# Patient Record
Sex: FEMALE | Race: BLACK OR AFRICAN AMERICAN | Employment: PART TIME | URBAN - METROPOLITAN AREA
[De-identification: names, ages, dates, MRNs, and addresses within clinical notes are randomized per-mention and may not be internally consistent; named-entity substitution may affect disease eponyms.]

---

## 2018-05-17 ENCOUNTER — HOSPITAL ENCOUNTER (OUTPATIENT)
Dept: PHYSICAL THERAPY | Age: 43
Discharge: HOME | End: 2018-05-17
Payer: MEDICAID

## 2018-05-17 ENCOUNTER — APPOINTMENT (OUTPATIENT)
Dept: PHYSICAL THERAPY | Age: 43
End: 2018-05-17
Payer: MEDICAID

## 2018-05-17 PROCEDURE — 97110 THERAPEUTIC EXERCISES: CPT

## 2018-05-17 PROCEDURE — 97140 MANUAL THERAPY 1/> REGIONS: CPT

## 2018-05-17 PROCEDURE — 97161 PT EVAL LOW COMPLEX 20 MIN: CPT

## 2018-05-17 NOTE — PROGRESS NOTES
PT DAILY TREATMENT NOTE     Patient Name: Flo Beltran  Date:2018  : 1975  [x]  Patient  Verified  Payor: BLUE CROSS MEDICAID / Plan: [facility] HEALTHKEEPERS PLUS / Product Type: Managed Care Medicaid /    In time:3:04  Out time: 3:55  Total Treatment Time (min): 51  Total Timed Codes (min): 25  1:1 Treatment Time (min): 51   Visit #: 1 of     Treatment Area: Acute leg pain, right [M79.604]    SUBJECTIVE  Pain Level (0-10 scale): 7  Any medication changes, allergies to medications, adverse drug reactions, diagnosis change, or new procedure performed?: [x] No    [] Yes (see summary sheet for update)  Subjective functional status/changes:   [] No changes reported  SEE IE for Subjective Information    OBJECTIVE    15 min Therapeutic Exercise:  [x] See flow sheet : AROM R ankle, strap gastroc/solus stretch, Ankle ABCs, heel slide, pre gait and gait training    Rationale: increase ROM, increase strength and improve coordination to improve the patients ability to improve gait, work tolerance    10 min Manual Therapy:  IASTM to R gastroc in prone    Rationale: decrease pain, increase ROM, increase tissue extensibility and decrease trigger points to improve gait           x min Patient Education: [x] Review HEP    [] Progressed/Changed HEP based on:  HEP, prognosis, gait      [] positioning   [] body mechanics   [] transfers   [x] heat/ice application        Other Objective/Functional Measures:     Physical Therapy Evaluation  - Foot and Ankle    Gait: (B) AC, foot flat on the R, slow, antalgic     ROM/Strength        AROM        PROM            Strength (1-5)   Left Right Left Right Left  Right   Dorsiflexion 15 0  0 4 pain   Plantarflexion  20  30 4 pain   Inversion 35 22  25 4 Pain    Eversion 15 8  10 4 Pain    Great Toe Ext     NA NA   Great Toe Flex NA NA   NA NA     Flexibility:   Gastroc: see abov  Soleus: NE  HS - 90/90 HS test R 60, L 30    Palpation: NT   Left  Right   1st ray Midfoot      calcaneus     Navicular drop          Optional Tests:  SLS  (L):  (R): limited by pain    Heel/toe walking: none     Swelling:   Left (cm) Right (cm)   Figure 8:     Midfoot:      Malleoli Level:     MTH:     2\" proximal to Mall:     4\" Prox to malleoli       Other tests/ comments:    R knee AAROM 0 to 120  L knee: 0 to 122    Pain Level (0-10 scale) post treatment: 6    ASSESSMENT/Changes in Function: see IE. Pt demo's improved gait pattern with heel to toe, still lacking full toe off on the R but with improved weight shifting on the R. Anticipate D/C of crutches soon due to progress today     Patient will continue to benefit from skilled PT services to modify and progress therapeutic interventions, address functional mobility deficits, address ROM deficits, address strength deficits, analyze and address soft tissue restrictions, analyze and cue movement patterns, analyze and modify body mechanics/ergonomics, assess and modify postural abnormalities, address imbalance/dizziness and instruct in home and community integration to attain remaining goals.      [x]  See Plan of Care  []  See progress note/recertification  []  See Discharge Summary         Progress towards goals / Updated goals:  SEE IE FOR GOALS     PLAN  []  Upgrade activities as tolerated     []  Continue plan of care  []  Update interventions per flow sheet       []  Discharge due to:_  [x]  Other:Initiate POC as stated in the IE      Justification for Eval Code Complexity:  Patient History : low  Examination see IE  Clinical Presentation: stable  Clinical Decision Making : OVIDIO 30/100     Ashwin Fragoso DPT 5/17/2018  3:07 PM

## 2018-05-17 NOTE — PROGRESS NOTES
[address] [facility] [address] [facility] AT [address] [facility], [place] [address]  Phone: [phone] Fax: [phone] / STATEMENT OF MEDICAL NECESSITY FOR PHYSICAL THERAPY SERVICES  Patient Name: Gaston Ceballos : 1975   Medical   Diagnosis: Acute leg pain, right [M79.604] Treatment Diagnosis: R gastroc strain    Onset Date: 18     Referral Source: Niya Heredia MD [facility] [place] by [facility]): 2018   Prior Hospitalization: See medical history Provider #: [id number]   Prior Level of Function: WNL, working full time   Comorbidities: DM   Medications: Verified on Patient Summary List   The Plan of Care and following information is based on the information from the initial evaluation.   ========================================================================  Assessment / key information:  Pt is a 37 yo female with R gastroc sprain when running from a bumble bee on 18. Notes immediate pain, no swelling or pop. No imaging performed. Pain remains relatively unchanged. Social: stairs to enter,  no railing; lives with daughter. Previous rx has included the following: none. She presents with pain ranging from 5-8/10, located posterior R gastroc, intermittently into R ankle and R knee due to gait deviations. Pain is made worse with walking 2 minutes, better with sitting down. No edema, bruising. (-) Fenton test.  Gastroc R 0, L 20. HS 90/90: R 60, L 30. Functional Limitations include Out of work, entertainment, walking, standing for ADLs and self-care. Pt will benefit from PT interventions to address the aforementioned deficits and allow pt to return to PLOF.                   AROM                             PROM                          Strength (1-5)    Left Right Left Right Left  Right   Dorsiflexion 15 0   0 4 pain   Plantarflexion   20   30 4 pain   Inversion 35 22   25 4 Pain    Eversion 15 8   10 4 Pain    Great Toe Ext         NA NA   Great Toe Flex NA NA     NA NA   ====================================================  Eval Complexity: History: LOW Complexity : Zero comorbidities / personal factors that will impact the outcome / POCExam:HIGH Complexity : 4+ Standardized tests and measures addressing body structure, function, activity limitation and / or participation in recreation  Presentation: LOW Complexity : Stable, uncomplicated  Clinical Decision Making:MEDIUM Complexity : FOTO score of 26-74Overall Complexity:LOW   Problem List: pain affecting function, decrease ROM, decrease strength, edema affecting function, impaired gait/ balance, decrease ADL/ functional abilitiies, decrease activity tolerance, decrease flexibility/ joint mobility and decrease transfer abilities   Treatment Plan may include any combination of the following: Therapeutic exercise, Therapeutic activities, Neuromuscular re-education, Physical agent/modality, Gait/balance training, Manual therapy, Patient education, Self Care training, Functional mobility training, Home safety training and Stair training  Patient / Family readiness to learn indicated by: asking questions, trying to perform skills and interest  Persons(s) to be included in education: patient (P)  Barriers to Learning/Limitations: None  Measures taken:    Patient Goal (s): \"no pain \"   Patient self reported health status: fair  Rehabilitation Potential: good   Short Term Goals: To be accomplished in  2  treatments:  1. Pt will be I and compliant with HEP to improve deficits and allow pt to return to PLOF    Long Term Goals: To be accomplished in  8-12  treatments:  1. Pt will increase score on the FOTO to > or = 57/100 to demo an increase in functional activity tolerance. 2. Pt will have an increase in R ankle AROM to 12 DF and > or = 40 PF to improve gait, stair negotiation and return to work   3. Pt will be able to self-manage pain to < or = 2/10 ave to improve quality of life and functional activities.    4. Pt will be able to walk with normal, independent gait for > or = 10 minutes without an increase in pain to return to work. Frequency / Duration:   Patient to be seen  2  times per week for 8-12  treatments:  Patient / Caregiver education and instruction: self care, activity modification and exercises  G-Codes (GP): jack  Therapist Signature: Anila Roberson DPT Date: 0/98/0264   Certification Period: na Time: 3:07 PM   ========================================================================  I certify that the above Physical Therapy Services are being furnished while the patient is under my care. I agree with the treatment plan and certify that this therapy is necessary. Physician Signature:        Date:       Time:   Please sign and return to [facility] or you may fax the signed copy to [phone]. Thank you.

## 2018-05-21 ENCOUNTER — HOSPITAL ENCOUNTER (OUTPATIENT)
Dept: PHYSICAL THERAPY | Age: 43
Discharge: HOME | End: 2018-05-21
Payer: MEDICAID

## 2018-05-21 PROCEDURE — 97110 THERAPEUTIC EXERCISES: CPT

## 2018-05-21 PROCEDURE — 97140 MANUAL THERAPY 1/> REGIONS: CPT

## 2018-05-21 NOTE — PROGRESS NOTES
PT DAILY TREATMENT NOTE     Patient Name: Lissa Correia  Date:2018  : 1975  [x]  Patient  Verified  Payor: BLUE CROSS MEDICAID / Plan: [facility]KEEPERS PL[facility] / Product Type: Managed Care Medicaid /    In time: 1:00  Out time: 1:49  Total Treatment Time (min): 49  Total Timed Codes (min): 49  1:1 Treatment Time (min): n/a   Visit #: 2 of     Treatment Area: Acute leg pain, right [M79.604]    SUBJECTIVE  Pain Level (0-10 scale): 4  Any medication changes, allergies to medications, adverse drug reactions, diagnosis change, or new procedure performed?: [x] No    [] Yes (see summary sheet for update)  Subjective functional status/changes:   [] No changes reported  Pt states d/c use of crutch on the ; not noticing any increase in pain. OBJECTIVE      27 min Therapeutic Exercise:  [x] See flow sheet :   Rationale: increase ROM, increase strength and improve coordination to improve the patients ability to improve gait, work tolerance      12 min Manual Therapy:  STM to R gastroc in prone. P/A TC joint mobs grade 2-3. Manual gastroc stretching with slack/tack. Rationale: decrease pain, increase ROM, increase tissue extensibility and decrease trigger points to improve gait     (10)  NB due to rule of 15 min Gait Training:    -Pre gait: wt shifts side<->side with 2UE support 5x10\" to right. Fwd/back R/L 5x10\", 2 UE support.  -Amb. 2 laps in // bars with vc's for increased heel strike and toe off.   -Amb. [de-identified]' in [address] [place] [id number] with L SPC and vc's for gait sequencing with 1 AD and continued R toe off/heel strike. Pt ed on use of 1 crutch for ambulation in home/community and use of 2 crutches if needed for pain/regression of gait pattern noted. Pt verbalizes understanding and agreement   Rationale:    To increase safety and independence with ambulation with least restrictive AD and decreased fall risk.                  min Patient Education: [x] Review HEP    [] Progressed/Changed HEP based on:   [] positioning   [] body mechanics   [] transfers   [] heat/ice application        Other Objective/Functional Measures:   -bike performed for AAROM (rocking)  -new exercises added as per flow sheet with vc's provided for form/technique 100% of the time.   -requires BUE support with wt shifts in // bars due to pain c/o; cues to maintain upright trunk posture    Pain Level (0-10 scale) post treatment: 3    ASSESSMENT/Changes in Function: Ambulates into session with step to R gait pattern and decreased wb'ing RLE; no AD.; improves to reciprocal gait with decreased R step length post manual and exercises. Pt with mm guarding/fear limiting DF A/AA ROM today. Gait pattern significantly improved after gait training, with very slow gait speed and only minimally antalgic. Patient will continue to benefit from skilled PT services to modify and progress therapeutic interventions, address functional mobility deficits, address ROM deficits, address strength deficits, analyze and address soft tissue restrictions and analyze and cue movement patterns to attain remaining goals. Progress towards goals / Updated goals:  Initiated exercises this session    PLAN  [x]  Upgrade activities as tolerated     [x]  Continue plan of care  []  Update interventions per flow sheet       []  Discharge due to:_  []  Other:_      Daniella Velazquez, PT 5/21/2018  7:55 AM

## 2018-05-23 ENCOUNTER — HOSPITAL ENCOUNTER (OUTPATIENT)
Dept: PHYSICAL THERAPY | Age: 43
Discharge: HOME | End: 2018-05-23
Payer: MEDICAID

## 2018-05-23 PROCEDURE — 97116 GAIT TRAINING THERAPY: CPT

## 2018-05-23 PROCEDURE — 97110 THERAPEUTIC EXERCISES: CPT

## 2018-05-23 PROCEDURE — 97140 MANUAL THERAPY 1/> REGIONS: CPT

## 2018-05-23 NOTE — PROGRESS NOTES
PT DAILY TREATMENT NOTE     Patient Name: Flo Beltran  Date:2018  : 1975  [x]  Patient  Verified  Payor: BLUE CROSS MEDICAID / Plan: [facility] / Product Type: Managed Care Medicaid /    In time: 11:30 am        Out time: 12:18 pm  Total Treatment Time (min): 48  Visit #: 3 of     Treatment Area: Acute leg pain, right [M79.604]    SUBJECTIVE  Pain Level (0-10 scale): 3  Any medication changes, allergies to medications, adverse drug reactions, diagnosis change, or new procedure performed?: [x] No    [] Yes (see summary sheet for update)  Subjective functional status/changes:   [] No changes reported  \"I am using the crutch only for walking outside. I have been walking without it at home. I do not have a cane at home. I cannot wait to get back to work. \"    OBJECTIVE  25 min Therapeutic Exercise:  [x] See flow sheet:   Rationale: increase ROM, increase strength and improve coordination to improve the patients ability to improve gait, work tolerance    11 min Manual Therapy:  IASTM to (R) gastroc in prone; P/A TC joint mobs grade 2-3,   manual gastroc stretching   Rationale: decrease pain, increase ROM, increase tissue extensibility and decrease trigger points to improve gait     12 min Gait Training:    -Pre gait: weight shifts, forward/back and lateral, no UE (A) 5 x 10 seconds each  -Amb. 2 laps in // bars with VC's for increased heel strike and knee flexion during midstance   -Amb. [de-identified]' in clinic with (L) axillary crutch and VC's for gait sequencing with 1 AD and continued R toe off/heel strike  -Amb 120 feet in clinic, (I) with VC's for gait sequencing/patterning and maintenance of upright posture   Rationale:  to increase safety and independence with ambulation with least restrictive AD and decreased fall risk.            X min Patient Education: [x] Review HEP     Other Objective/Functional Measures:   Gait: slow, non-antalgic, symmetrical stride length but decreased concentric GSC for (R) terminal stance  Patient able to perform full revolutions on bike gradually after rocking. Pain Level (0-10 scale) post treatment: 1-2    ASSESSMENT/Changes in Function:   Patient able to demo improved gait quality following tx. Cont's GSC strength deficits indicated by reduced ankle PF during terminal stance. Patient will require further skilled PT to improve mobility and strength for return to work duties. Patient will continue to benefit from skilled PT services to modify and progress therapeutic interventions, address functional mobility deficits, address ROM deficits, address strength deficits, analyze and address soft tissue restrictions and analyze and cue movement patterns to attain remaining goals. Progress towards goals / Updated goals:  Short Term Goals: To be accomplished in  2  treatments:  1. Pt will be I and compliant with HEP to improve deficits and allow pt to return to PLOF. -Goal progressing; HEP reviewed today and pt notes compliance but req 100% cueing in clinic (5/23/18)  Long Term Goals: To be accomplished in  8-12  treatments:  1. Pt will increase score on the FOTO to > or = 57/100 to demo an increase in functional activity tolerance. 2. Pt will have an increase in R ankle AROM to 12 DF and > or = 40 PF to improve gait, stair negotiation and return to work   3. Pt will be able to self-manage pain to < or = 2/10 ave to improve quality of life and functional activities. 4. Pt will be able to walk with normal, independent gait for > or = 10 minutes without an increase in pain to return to work.  -Goal progressing; pt able to demo improving gait quality amb (I) for 5 minutes without inc pain (5/23/18)    PLAN  [x]  Upgrade activities as tolerated     [x]  Continue plan of care  []  Update interventions per flow sheet       []  Discharge due to:_  []  Other:_      Delmar Meckel, PTA  5/23/2018

## 2018-05-31 ENCOUNTER — OFFICE VISIT (OUTPATIENT)
Dept: OBGYN CLINIC | Age: 43
End: 2018-05-31

## 2018-05-31 VITALS
RESPIRATION RATE: 18 BRPM | HEIGHT: 59 IN | SYSTOLIC BLOOD PRESSURE: 143 MMHG | DIASTOLIC BLOOD PRESSURE: 92 MMHG | HEART RATE: 87 BPM | WEIGHT: 170 LBS | BODY MASS INDEX: 34.27 KG/M2

## 2018-05-31 DIAGNOSIS — Z01.411 ENCOUNTER FOR GYNECOLOGICAL EXAMINATION WITH ABNORMAL FINDING: Primary | ICD-10-CM

## 2018-05-31 DIAGNOSIS — D25.9 UTERINE LEIOMYOMA, UNSPECIFIED LOCATION: ICD-10-CM

## 2018-05-31 NOTE — PROGRESS NOTES
Subjective:   38 y.o. female for Well Woman Check. Patient's last menstrual period was 2018. Social History: single partner, contraception - none. Pertinent past medical hstory: no history of HTN, DVT, CAD, DM, liver disease, migraines or smoking. No Known Allergies  Past Medical History:   Diagnosis Date    Diabetes ([facility] [address]     Headache      Past Surgical History:   Procedure Laterality Date    HX BREAST LUMPECTOMY  2 years ago    left breast    HX  SECTION  ,     Family History   Problem Relation Age of Onset    No Known Problems Maternal Grandmother     Hypertension Maternal Grandfather     Cancer Maternal Grandfather     No Known Problems Paternal Grandmother     No Known Problems Paternal Grandfather      Social History   Substance Use Topics    Smoking status: Never Smoker    Smokeless tobacco: Former User    Alcohol use Yes        ROS:  Feeling well. No dyspnea or chest pain on exertion. No abdominal pain, change in bowel habits, black or bloody stools. No urinary tract symptoms. GYN ROS: normal menses, no abnormal bleeding, pelvic pain or discharge, no breast pain or new or enlarging lumps on self exam. She complains of dysmenorrhea and requests an ultrasound to evaluate previously diagnosed fibroids. No neurological complaints. Objective:     Visit Vitals    BP (!) 143/92    Pulse 87    Resp 18    Ht 4' 11\" (1.499 m)    Wt 170 lb (77.1 kg)    LMP 2018    BMI 34.34 kg/m2     The patient appears well, alert, oriented x 3, in no distress. ENT normal.  Neck supple. No adenopathy or thyromegaly. ALLI. Lungs are clear, good air entry, no wheezes, rhonchi or rales. S1 and S2 normal, no murmurs, regular rate and rhythm. Abdomen soft without tenderness, guarding, mass or organomegaly. Extremities show no edema, normal peripheral pulses. Neurological is normal, no focal findings.     BREAST EXAM: right breast normal, left breast with a smooth and well-circumscribed 1 cm nodule at the 9 o'clock position, no skin or nipple changes or axillary nodes    PELVIC EXAM: VULVA: normal appearing vulva with no masses, tenderness or lesions, VAGINA: normal appearing vagina with normal color and discharge, no lesions, CERVIX: normal appearing cervix without discharge or lesions, UTERUS: enlarged to 14 week's size, irregular, ADNEXA: normal adnexa in size, nontender and no masses, PAP: Pap smear done today, HPV test    Assessment/Plan:   well woman  Left breast mass. Patient states that this was noted at the time of her mammogram several months ago and was not thought to be concernng. History of fibroids. Repeat ultrasound ordered  mammogram  pap smear  Follow up after US. Plan of care discussed. Patient expressed understanding.

## 2018-06-01 ENCOUNTER — HOSPITAL ENCOUNTER (OUTPATIENT)
Dept: PHYSICAL THERAPY | Age: 43
Discharge: HOME | End: 2018-06-01
Payer: MEDICAID

## 2018-06-01 ENCOUNTER — APPOINTMENT (OUTPATIENT)
Dept: PHYSICAL THERAPY | Age: 43
End: 2018-06-01
Payer: MEDICAID

## 2018-06-01 PROCEDURE — 97140 MANUAL THERAPY 1/> REGIONS: CPT

## 2018-06-01 PROCEDURE — 97110 THERAPEUTIC EXERCISES: CPT

## 2018-06-01 NOTE — PROGRESS NOTES
PT DAILY TREATMENT NOTE     Patient Name: Seth Gordon  Date:2018  : 1975  [x]  Patient  Verified  Payor: BLUE CROSS MEDICAID / Plan: [facility] HEALTHKEEPERS PLUS / Product Type: Managed Care Medicaid /    In time: 11:01 am        Out time: 11:54 am  Total Treatment Time (min): 53  Visit #: 4 of     Treatment Area: Acute leg pain, right [M79.604]    SUBJECTIVE  Pain Level (0-10 scale): 0  Any medication changes, allergies to medications, adverse drug reactions, diagnosis change, or new procedure performed?: [x] No    [] Yes (see summary sheet for update)  Subjective functional status/changes:   [] No changes reported  \"No pain today. I am walking better, just slow. \"    OBJECTIVE  36 min Therapeutic Exercise:  [x] See flow sheet: added standing HR   Rationale: increase ROM, increase strength and improve coordination to improve the patients ability to improve gait, work tolerance    12 min Manual Therapy:  IASTM to (R) gastroc in prone; P/A TC joint mobs grade 2-3,   manual gastroc stretching   Rationale: decrease pain, increase ROM, increase tissue extensibility and decrease trigger points to improve gait     5 min Gait Training:    -Pre gait: weight shifts, forward/back and lateral, no UE (A) 5 x 10 seconds each   Rationale:  to increase safety and independence with ambulation with least restrictive AD and decreased fall risk. X min Patient Education: [x] Review HEP - add standing HR and SR      Other Objective/Functional Measures:   Challenged with SR. Pain Level (0-10 scale) post treatment: 0    ASSESSMENT/Changes in Function:   Patient able to continue with full revolution on bike. Improved gait quality with proper HS and WB into foot flat, but req min VCs to avoid lateral lean. Min reduction in concentric [address] for toe off. Discussed benefits of continued mobility and strengthening to improve GSC strength to inc amb beryl.     Patient will continue to benefit from skilled PT services to modify and progress therapeutic interventions, address functional mobility deficits, address ROM deficits, address strength deficits, analyze and address soft tissue restrictions and analyze and cue movement patterns to attain remaining goals. Progress towards goals / Updated goals:  Short Term Goals: To be accomplished in  2  treatments:  1. Pt will be I and compliant with HEP to improve deficits and allow pt to return to PLOF. -Goal progressing; HEP reviewed today and pt notes compliance but req 100% cueing in clinic (5/23/18)  Long Term Goals: To be accomplished in  8-12  treatments:  1. Pt will increase score on the FOTO to > or = 57/100 to demo an increase in functional activity tolerance. 2. Pt will have an increase in R ankle AROM to 12 DF and > or = 40 PF to improve gait, stair negotiation and return to work   3. Pt will be able to self-manage pain to < or = 2/10 ave to improve quality of life and functional activities. -Goal progressing; pt ave 0/10 upon arrival today, notes carryover from last session (6/1/18)  4. Pt will be able to walk with normal, independent gait for > or = 10 minutes without an increase in pain to return to work.  -Goal progressing; pt able to demo improving gait quality amb (I) for 5 minutes without inc pain (5/23/18)    PLAN  [x]  Upgrade activities as tolerated     [x]  Continue plan of care  []  Update interventions per flow sheet       []  Discharge due to:_  []  Other:_      Virgil Keane, PTA  6/1/2018

## 2018-06-04 LAB
CYTOLOGIST CVX/VAG CYTO: NORMAL
CYTOLOGY CVX/VAG DOC THIN PREP: NORMAL
DX ICD CODE: NORMAL
HPV I/H RISK 4 DNA CVX QL PROBE+SIG AMP: NEGATIVE
Lab: NORMAL
OTHER STN SPEC: NORMAL
PATH REPORT.FINAL DX SPEC: NORMAL
STAT OF ADQ CVX/VAG CYTO-IMP: NORMAL

## 2018-06-05 ENCOUNTER — HOSPITAL ENCOUNTER (OUTPATIENT)
Dept: PHYSICAL THERAPY | Age: 43
Discharge: HOME | End: 2018-06-05
Payer: MEDICAID

## 2018-06-05 PROCEDURE — 97140 MANUAL THERAPY 1/> REGIONS: CPT

## 2018-06-05 PROCEDURE — 97116 GAIT TRAINING THERAPY: CPT

## 2018-06-05 PROCEDURE — 97110 THERAPEUTIC EXERCISES: CPT

## 2018-06-05 NOTE — PROGRESS NOTES
PT DAILY TREATMENT NOTE     Patient Name: General Risk  Date:2018  : 1975  [x]  Patient  Verified  Payor: BLUE [employer]OSS MEDICAID / Plan: [facility] [place] [employer] / Product Type: Managed Care Medicaid /    In time: 9:30 am        Out time: 10:25  am  Total Treatment Time (min): 55  Visit #: 5 of     Treatment Area: Acute leg pain, right [M79.604]    SUBJECTIVE  Pain Level (0-10 scale): 0  Any medication changes, allergies to medications, adverse drug reactions, diagnosis change, or new procedure performed?: [x] No    [] Yes (see summary sheet for update)  Subjective functional status/changes:   [] No changes reported  \"I walk too slow. \"    OBJECTIVE  36 min Therapeutic Exercise:  [x] See flow sheet: added RB taps (forward/backwards and lateral), added toe raises   Rationale: increase ROM, increase strength and improve coordination to improve the patients ability to improve gait, work tolerance    11 min Manual Therapy:  IASTM to (R) gastroc in prone; P/A TC joint mobs grade 2-3,   manual gastroc stretching   Rationale: decrease pain, increase ROM, increase tissue extensibility and decrease trigger points to improve gait     8 min Gait Trainin feet without AD req SBA with occasional distant S and VCs to inc ankle PF during terminal stance; cont to review terminal stance in phases   Rationale:  to increase safety and independence with ambulation with least restrictive AD and decreased fall risk. X min Patient Education: [x] Review HEP - add std HR and SR EO      Other Objective/Functional Measures:   SR: 30 seconds with min inc in hip sway    Pain Level (0-10 scale) post treatment: 0    ASSESSMENT/Changes in Function:   Significant improvement in gait quality today with visible GSC activation during heel/toe off of (R) stance phase. Patient demos improved tolerance to standing HR today.     Patient will continue to benefit from skilled PT services to modify and progress therapeutic interventions, address functional mobility deficits, address ROM deficits, address strength deficits, analyze and address soft tissue restrictions and analyze and cue movement patterns to attain remaining goals. Progress towards goals / Updated goals:  Short Term Goals: To be accomplished in  2  treatments:  1. Pt will be I and compliant with HEP to improve deficits and allow pt to return to PLOF. -Goal progressing; HEP reviewed today and pt notes compliance but req 100% cueing in clinic (5/23/18)  Long Term Goals: To be accomplished in  8-12  treatments:  1. Pt will increase score on the FOTO to > or = 57/100 to demo an increase in functional activity tolerance. 2. Pt will have an increase in R ankle AROM to 12 DF and > or = 40 PF to improve gait, stair negotiation and return to work   3. Pt will be able to self-manage pain to < or = 2/10 ave to improve quality of life and functional activities. -Goal progressing; pt ave 0/10 upon arrival today, notes carryover from last session (6/1/18)  4. Pt will be able to walk with normal, independent gait for > or = 10 minutes without an increase in pain to return to work.  -Goal progressing; pt able to demo improving gait quality amb (I) for 8 minutes without inc pain (6/5/18)    PLAN  [x]  Upgrade activities as tolerated     [x]  Continue plan of care  []  Update interventions per flow sheet       []  Discharge due to:_  [x]  Other: assess AROM FROILAN Cross, PTA  6/5/2018

## 2018-06-07 ENCOUNTER — HOSPITAL ENCOUNTER (OUTPATIENT)
Dept: PHYSICAL THERAPY | Age: 43
Discharge: HOME | End: 2018-06-07
Payer: MEDICAID

## 2018-06-07 PROCEDURE — 97110 THERAPEUTIC EXERCISES: CPT

## 2018-06-07 PROCEDURE — 97140 MANUAL THERAPY 1/> REGIONS: CPT

## 2018-06-07 NOTE — PROGRESS NOTES
PT DAILY TREATMENT NOTE     Patient Name: Ingrid Bingham  Date:2018  : 1975  [x]  Patient  Verified  Payor: BLUE CROSS MEDICAID / Plan: [facility] [employer]EEPERS PLUS / Product Type: Managed Care Medicaid /    In time: 9:30 am      Out time: 10:40 am  Total Treatment Time (min): 70  Visit #: 6 of     Treatment Area: Acute leg pain, right [M79.604]    SUBJECTIVE  Pain Level (0-10 scale): 0  Any medication changes, allergies to medications, adverse drug reactions, diagnosis change, or new procedure performed?: [x] No    [] Yes (see summary sheet for update)  Subjective functional status/changes:   [] No changes reported  \"I walked 30 minutes the other day and it went fine. I can't run yet. \"    OBJECTIVE  Modality rationale: increase tissue extensibility, improve blood flow / tissue vascularity, increase tissue healing to improve the patients ability to > tolerance to prolonged ambulation   Min Type Additional Details    [] Estim: []Att   []Unatt        []TENS instruct                  []IFC  []Premod   []NMES                     []Other:  []w/US   []w/ice   []w/heat  Position:  Location:    []  Ultrasound: []Continuous   [] Pulsed                           []1MHz   []3MHz Location:  W/cm2:   10 []  Ice     [x]  heat  []  Ice massage Position: prone   Location: (R) gastroc    []  Vasopneumatic Device Pressure:       [] lo [] med [] hi   Temperature: [] lo [] med [] hi   [x] Skin assessment post-treatment:  [x]intact []redness- no adverse reaction       []redness  adverse reaction:     48 min Therapeutic Exercise:  [x] See flow sheet: added tandem walking and incline stretch   Rationale: increase ROM, increase strength and improve coordination to improve the patients ability to improve gait, work tolerance    12 min Manual Therapy: prone active release technique (R) medial > lateral gastroc f/b TPR to medial soleus   Rationale: decrease pain, increase ROM, increase tissue extensibility and decrease trigger points to improve gait     0 min Gait Training:  NT   Rationale:  to increase safety and independence with ambulation with least restrictive AD and decreased fall risk. X min Patient Education: [x] Review HEP     Other Objective/Functional Measures:   (R) ankle AROM: DF 10 deg, PF 40 deg, IV 35 deg, EV 16 deg  Pain: (A) 0  Improving toe off with gait. Noted mild inc in toe out on the (R) LE; may consider tibial mobs NV. Pain Level (0-10 scale) post treatment: 0    ASSESSMENT/Changes in Function:   Patient demos slow, steady progress with posterior chain tissue elongation, improved from 0 deg at IE to 10 deg today. Improved tolerance to standing HR's with later onset of fatigue and pt demos readiness to progress to SL. Dynamic balance with short base of support is good without LOB, indicating improved activation of the lateral/medial ankle stabilizers. Patient will continue to benefit from skilled PT services to modify and progress therapeutic interventions, address functional mobility deficits, address ROM deficits, address strength deficits, analyze and address soft tissue restrictions and analyze and cue movement patterns to attain remaining goals. Progress towards goals / Updated goals:  Short Term Goals: To be accomplished in  2  treatments:  1. Pt will be I and compliant with HEP to improve deficits and allow pt to return to PLOF. -Goal progressing; HEP reviewed today and pt notes compliance but req 100% cueing in clinic (5/23/18)  Long Term Goals: To be accomplished in  8-12  treatments:  1. Pt will increase score on the FOTO to > or = 57/100 to demo an increase in functional activity tolerance. 2. Pt will have an increase in R ankle AROM to 12 DF and > or = 40 PF to improve gait, stair negotiation and return to work. -Goal near met; (R) ankle AROM 10 deg DF and 40 deg PF in short-sitting (6/7/18)  3.  Pt will be able to self-manage pain to < or = 2/10 ave to improve quality of life and functional activities. -Goal met; pt notes 0/10 average pain (6/7/18)  4. Pt will be able to walk with normal, independent gait for > or = 10 minutes without an increase in pain to return to work.  -Goal progressing; pt able to amb 15 minutes x 2 with discomfort only (6/7/18)    PLAN  [x]  Upgrade activities as tolerated     [x]  Continue plan of care  []  Update interventions per flow sheet       []  Discharge due to:_  [x]  Other: may consider (R) tibial mobs NV to address inc in toe out with gait NV; progress HRs in reps or progress to SL HR; cont Appetite+, PTA  6/7/2018

## 2018-06-08 ENCOUNTER — HOSPITAL ENCOUNTER (OUTPATIENT)
Dept: ULTRASOUND IMAGING | Age: 43
Discharge: HOME | End: 2018-06-08
Attending: OBSTETRICS & GYNECOLOGY
Payer: MEDICAID

## 2018-06-08 DIAGNOSIS — D25.9 UTERINE LEIOMYOMA, UNSPECIFIED LOCATION: ICD-10-CM

## 2018-06-08 PROCEDURE — 76830 TRANSVAGINAL US NON-OB: CPT

## 2018-06-20 ENCOUNTER — TELEPHONE (OUTPATIENT)
Dept: OBGYN CLINIC | Age: 43
End: 2018-06-20

## 2018-06-21 ENCOUNTER — OFFICE VISIT (OUTPATIENT)
Dept: OBGYN CLINIC | Age: 43
End: 2018-06-21

## 2018-06-21 VITALS
HEIGHT: 59 IN | SYSTOLIC BLOOD PRESSURE: 133 MMHG | HEART RATE: 98 BPM | WEIGHT: 170 LBS | BODY MASS INDEX: 34.27 KG/M2 | DIASTOLIC BLOOD PRESSURE: 78 MMHG | RESPIRATION RATE: 18 BRPM | OXYGEN SATURATION: 100 %

## 2018-06-21 DIAGNOSIS — D25.9 UTERINE LEIOMYOMA, UNSPECIFIED LOCATION: ICD-10-CM

## 2018-06-21 DIAGNOSIS — N94.6 DYSMENORRHEA: Primary | ICD-10-CM

## 2018-06-21 RX ORDER — IBUPROFEN 800 MG/1
800 TABLET ORAL
Qty: 30 TAB | Refills: 3 | Status: SHIPPED | OUTPATIENT
Start: 2018-06-21

## 2018-06-21 NOTE — PROGRESS NOTES
Subjective:      Patient presents for follow up of dysmenorrhea and a previous diagnosis of uterine fibroids. She states that her periods are regular but heavy with cramping, and that she sometimes has cramping between cycles also. She has only used Tylenol so far for relief. Current Outpatient Prescriptions   Medication Sig Dispense Refill    ibuprofen (MOTRIN) 800 mg tablet Take 1 Tab by mouth every six (6) hours as needed for Pain. 30 Tab 3     No Known Allergies  Past Medical History:   Diagnosis Date    Diabetes ([address]     Headache      Past Surgical History:   Procedure Laterality Date    HX BREAST LUMPECTOMY  2 years ago    left breast    HX  SECTION  ,     Family History   Problem Relation Age of Onset    No Known Problems Maternal Grandmother     Hypertension Maternal Grandfather     Cancer Maternal Grandfather     No Known Problems Paternal Grandmother     No Known Problems Paternal Grandfather      Social History   Substance Use Topics    Smoking status: Never Smoker    Smokeless tobacco: Former User    Alcohol use Yes      Review of Systems    A comprehensive review of systems was negative except for that written in the HPI. Objective:     Visit Vitals    /78    Pulse 98    Resp 18    Ht 4' 11\" (1.499 m)    Wt 170 lb (77.1 kg)    LMP 2018    SpO2 100%    BMI 34.34 kg/m2     General appearance: alert, cooperative, no distress, appears stated age, moderately obese  Exam not indicated    Pelvic ultrasound from 18 reviewed and discussed:  1. Several small uterine fibroids within the anterior uterine body as described. 2. Normal blood flow to the right ovary, small right ovarian cyst.  3. Nonvisualization of the left ovary secondary to bowel gas. Assessment/Plan:     Pelvic ultrasound discussed and patient reassured that the size of the fibroids is insignificant and probably not contributing to her symptoms.    Recommended that she switch medication to an  NSAID for better relief, Rx Motrin 800 mg. Follow up prn. Plan of care discussed. Patient expressed understanding. The entirety of the 15 minute visit was spent in counseling.

## 2018-08-07 NOTE — PROGRESS NOTES
[address] [facility] [address] [facility] [address]     Phone: [phone] Fax: [phone]  DISCHARGE SUMMARY  Patient Name: Jean Pierre Cobian : 1975   Treatment/Medical Diagnosis: Acute leg pain, right [M79.604]   Referral Source: Heriberto Heredia MD     Date of Initial Visit: 18 Attended Visits: 6 Missed Visits: 3     SUMMARY OF TREATMENT  Pt is a 39 yo female with R gastroc sprain when running from a bumble bee on 18. Notes immediate pain, no swelling or pop. No imaging performed. Pain remains relatively unchanged. Treatment has consisted of the following: Therapeutic exercise, Therapeutic activities, Physical agent/modality, Gait/balance training, Manual therapy, Patient education, Self Care training, Functional mobility training, Home safety training and Stair training. CURRENT STATUS  Patient was making excellent progress over 6 treatments and then did not return. Patient reported 0/10 ave pain over her last 3 treatments, stating \"I walked 30 minutes the other day and it went fine. \" Assessment as follows:  Improvements: (I) amb, walking tolerance, gait quality, mobility  (R) ankle AROM: DF 10 deg, PF 40 deg, IV 35 deg, EV 16 deg  Pain: (A) 0  Balance: SR 30 seconds    Goal/Measure of Progress:  1. Pt will increase score on the FOTO to > or = 57/100 to demo an increase in functional activity tolerance. -Goal NT; unable to assess due to non-return  2. Pt will have an increase in R ankle AROM to 12 DF and > or = 40 PF to improve gait, stair negotiation and return to work. -Goal near met; (R) ankle AROM 10 deg DF and 40 deg PF in short-sitting (18)  3. Pt will be able to self-manage pain to < or = 2/10 ave to improve quality of life and functional activities. -Goal met; pt notes 0/10 average pain   4. Pt will be able to walk with normal, independent gait for > or = 10 minutes without an increase in pain to return to work. -Goal met; pt able to amb 15 minutes x 2 with discomfort only     Home exercise program established on initial evaluation and progressed as patient is able to address deficits with patient noting compliance at least 1x daily. RECOMMENDATIONS  Discontinue therapy. Progressing towards or have reached established goals. If you have any questions/comments please contact us directly at [phone]. Thank you for allowing us to assist in the care of your patient. LPTA Signature: Delmar Meckel, [place] Date: 8/7/18   Therapist Signature: Radha Coyle DPT  Time: 7:28 AM     NOTE TO PHYSICIAN:  Your patient's insurance requires this discharge note be signed and returned. PLEASE COMPLETE THE ORDERS BELOW AND [facility]    ___ I have read the above report and request that my patient be discharged from therapy.      Physician Signature:        Date:       Time: